# Patient Record
Sex: FEMALE | Race: WHITE
[De-identification: names, ages, dates, MRNs, and addresses within clinical notes are randomized per-mention and may not be internally consistent; named-entity substitution may affect disease eponyms.]

---

## 2018-02-21 ENCOUNTER — HOSPITAL ENCOUNTER (INPATIENT)
Dept: HOSPITAL 77 - KA.ED | Age: 68
LOS: 5 days | Discharge: HOME | DRG: 139 | End: 2018-02-26
Attending: FAMILY MEDICINE | Admitting: PHYSICIAN ASSISTANT
Payer: COMMERCIAL

## 2018-02-21 DIAGNOSIS — F41.9: ICD-10-CM

## 2018-02-21 DIAGNOSIS — R21: ICD-10-CM

## 2018-02-21 DIAGNOSIS — J18.9: Primary | ICD-10-CM

## 2018-02-21 DIAGNOSIS — E53.8: ICD-10-CM

## 2018-02-21 DIAGNOSIS — D64.9: ICD-10-CM

## 2018-02-21 DIAGNOSIS — Z88.5: ICD-10-CM

## 2018-02-21 DIAGNOSIS — D47.3: ICD-10-CM

## 2018-02-21 DIAGNOSIS — C92.90: ICD-10-CM

## 2018-02-21 DIAGNOSIS — R78.81: ICD-10-CM

## 2018-02-21 DIAGNOSIS — N39.0: ICD-10-CM

## 2018-02-21 DIAGNOSIS — D72.0: ICD-10-CM

## 2018-02-21 DIAGNOSIS — B95.4: ICD-10-CM

## 2018-02-21 DIAGNOSIS — Z79.899: ICD-10-CM

## 2018-02-21 DIAGNOSIS — K21.9: ICD-10-CM

## 2018-02-21 DIAGNOSIS — E87.1: ICD-10-CM

## 2018-02-21 DIAGNOSIS — R53.81: ICD-10-CM

## 2018-02-21 DIAGNOSIS — Z98.84: ICD-10-CM

## 2018-02-21 LAB
CHLORIDE SERPL-SCNC: 94 MMOL/L (ref 98–115)
SODIUM SERPL-SCNC: 130 MMOL/L (ref 136–145)

## 2018-02-21 RX ADMIN — OXYCODONE HYDROCHLORIDE SCH MG: 10 TABLET, FILM COATED, EXTENDED RELEASE ORAL at 20:08

## 2018-02-22 LAB
CHLORIDE SERPL-SCNC: 97 MMOL/L (ref 98–115)
SODIUM SERPL-SCNC: 132 MMOL/L (ref 136–145)

## 2018-02-22 RX ADMIN — VITAMIN D, TAB 1000IU (100/BT) SCH UNITS: 25 TAB at 09:59

## 2018-02-22 RX ADMIN — CYANOCOBALAMIN TAB 500 MCG SCH MCG: 500 TAB at 09:58

## 2018-02-22 RX ADMIN — OXYCODONE HYDROCHLORIDE SCH MG: 10 TABLET, FILM COATED, EXTENDED RELEASE ORAL at 21:26

## 2018-02-22 NOTE — PCM.HP
H&P History of Present Illness





- General


Date of Service: 02/22/18


Admit Problem/Dx: 


 Admission Diagnosis/Problem





Admission Diagnosis/Problem      Pneumonia








Source of Information: Patient, Old Records, RN


History Limitations: Reports: No Limitations


  ** Generalized


Pain Score (Numeric/FACES): 3





- Related Data


Allergies/Adverse Reactions: 


 Allergies











Allergy/AdvReac Type Severity Reaction Status Date / Time


 


morphine Allergy  Rash Verified 02/21/18 16:08











Home Medications: 


 Home Meds





Dasatinib [Sprycel] 100 mg PO BEDTIME 01/17/17 [History]


Etodolac [Etodolac] 500 mg PO DAILY 01/17/17 [History]


LORazepam [Ativan] 0.5 mg PO BEDTIME 01/17/17 [History]


oxyCODONE ER [OxyCONTIN] 20 mg PO BEDTIME 01/17/17 [History]


Cholecalciferol (Vitamin D3) [Vitamin D3] 2,000 units PO DAILY 02/21/18 [History

]


Cyanocobalamin (Vitamin B12) [Vitamin B12] 500 mcg PO DAILY 02/21/18 [History]


Ferrous Sulfate 325 mg PO DAILY 02/21/18 [History]


Multivitamin [Multivitamins] 1 cap PO DAILY 02/21/18 [History]


Ranitidine HCl [Ranitidine] 150 mg PO BIDAC 02/21/18 [History]


Triamcinolone Acetonide [Triamcinolone Acetonide 0.025% Crm] 1 applic TOP DAILY 

PRN 02/22/18 [History]











Past Medical History


HEENT History: Reports: Impaired Vision


Gastrointestinal History: Reports: GERD


Musculoskeletal History: Reports: Back Pain, Chronic


Neurological History: Reports: Headaches, Chronic, Migraines


Psychiatric History: Reports: Anxiety


Endocrine/Metabolic History: Reports: Obesity/BMI 30+


Oncologic (Cancer) History: Reports: Leukemia, Other (See Below)


Other Oncologic History: Leukemia diagnosed 2007; takes spiracel po daily.





- Infectious Disease History


Infectious Disease History: Reports: Chicken Pox, Measles, Mumps





- Past Surgical History


Head Surgeries/Procedures: Reports: None


HEENT Surgical History: Reports: Tonsillectomy


GI Surgical History: Reports: Appendectomy, Bariatric Procedure, Cholecystectomy


Endocrine Surgical History: Reports: None


Neurological Surgical History: Reports: Spinal Fusion


Musculoskeletal Surgical History: Reports: Arthroscopic Knee, Other (See Below)


Other Musculoskeletal Surgeries/Procedures:: bone taken from hip and placed in 

lower back


Oncologic Surgical History: Reports: Bone Marrow Aspiration


Dermatological Surgical History: Reports: None





Social & Family History





- Family History


Family Medical History: Noncontributory





- Tobacco Use


Smoking Status *Q: Never Smoker


Second Hand Smoke Exposure: No





- Caffeine Use


Caffeine Use: Reports: Soda





- Alcohol Use


Days Per Week of Alcohol Use: 0





- Recreational Drug Use


Recreational Drug Use: No





H&P Review of Systems





- Review of Systems:


Review Of Systems: See Below


General: Reports: Fever, Chills (yesterday severe chills, none now), Decreased 

Appetite (poor appetite past 2 weeks however improved today. ).  Denies: Night 

Sweats, Weight Loss, Weight Gain


HEENT: Reports: Other (on/off dizziness past 2 weeks tej since fever. ).  Denies

: Hearing Changes, Sore Throat, Vertigo


Pulmonary: Reports: Shortness of Breath.  Denies: Wheezing, Cough, Sputum


Cardiovascular: Reports: Lightheadedness.  Denies: Chest Pain, Palpitations, 

Orthopnea, PND, Syncope, Blood Pressure Problem


Gastrointestinal: Reports: Black Stool (black stools for past 3 weeks. ), 

Diarrhea (loose stools past 3 weeks. ), Decreased Appetite.  Denies: Abdominal 

Pain, Anorexia, Bloody Stool, Constipation


Genitourinary: Denies: Dysuria, Frequency, Burning, Pain, Urgency, Incontinence

, Hematuria


Musculoskeletal: Reports: Back Pain (hx of surgery on spine, bone cadaver. )


Skin: Reports: Pallor, Rash


Psychiatric: Reports: No Symptoms


Neurological: Reports: No Symptoms


Hematologic/Lymphatic: Reports: Other (on Iron. ).  Denies: Anemia


Immunologic: Reports: No Symptoms, Other (allergy to morphine )





Exam





- Exam


Exam: See Below





- Vital Signs


Vital Signs: 


 Last Vital Signs











Temp  97.2 F   02/22/18 06:19


 


Pulse  98   02/22/18 06:19


 


Resp  22 H  02/22/18 06:19


 


BP  119/53 L  02/22/18 06:19


 


Pulse Ox  98   02/22/18 07:00











Weight: 265 lb 2 oz





- Exam


Quality Assessment: No: Supplemental Oxygen


General: Alert, Oriented, Cooperative.  No: Mild Distress


HEENT: No: Mucosa Moist & Pink


Neck: Supple


Lungs: Rhonchi


Cardiovascular: Regular Rate, Regular Rhythm, Systolic Murmur (Grade II)


GI/Abdominal Exam: Normal Bowel Sounds


 (Female) Exam: Deferred


Rectal (Female) Exam: Deferred, Black Stool


Back Exam: No: CVA Tenderness (L), CVA Tenderness (R)


Skin: Other (diffuse erythematous rash right thigh, left side of left breast 

and back, non-coalescent, nonblanching,)


Neurological: Cranial Nerves Intact


Neuro Extensive - Mental Status: Alert, Oriented x3, Normal Mood/Affect, Normal 

Cognition


Psychiatric: Alert, Normal Affect, Normal Mood





- Patient Data


Lab Results Last 24 hrs: 


 Laboratory Results - last 24 hr











  02/21/18 02/22/18 02/22/18 Range/Units





  18:45 07:29 07:29 


 


WBC   17.3 H   (5.0-10.0)  10^3/uL


 


RBC   3.61 L   (3.80-5.50)  10^6/uL


 


Hgb   10.9 L D   (12.0-16.0)  g/dL


 


Hct   32.0 L   (37.0-47.0)  %


 


MCV   88.5   (82.0-92.0)  fL


 


MCH   30.2   (27.0-31.0)  pg


 


MCHC   34.1   (32.0-36.0)  g/dL


 


RDW   13.8   (11.5-14.5)  %


 


Plt Count   316 H D   (150-300)  10^3/uL


 


MPV   7.5   (7.4-10.4)  fL


 


Neut % (Auto)   89.8 H   (50.0-70.0)  %


 


Lymph % (Auto)   6.1 L   (20.0-40.0)  %


 


Mono % (Auto)   3.2   (2.0-8.0)  %


 


Eos % (Auto)   0.2 L   (1.0-3.0)  %


 


Baso % (Auto)   0.7   (0.0-1.0)  %


 


Neut # (Auto)   15.5 H   (2.5-7.0)  10^3/uL


 


Lymph # (Auto)   1.1   (1.0-4.0)  10^3/uL


 


Mono # (Auto)   0.6   (0.1-0.8)  10^3/uL


 


Eos # (Auto)   0.0 L   (0.1-0.3)  10^3/uL


 


Baso # (Auto)   0.1   (0.0-0.1)  10^3/uL


 


Sodium    132 L  (136-145)  mmol/L


 


Potassium    3.4  (3.3-5.3)  mmol/L


 


Chloride    97 L  ()  mmol/L


 


Carbon Dioxide    24.4  (21.0-32.0)  mmol/L


 


BUN    16  (6-25)  mg/dL


 


Creatinine    0.71  (0.51-1.17)  mg/dL


 


Est Cr Clr Drug Dosing    70.01  mL/min


 


Estimated GFR (MDRD)    > 60  mL/min


 


Glucose    129 H  ()  mg/dL


 


Calcium    8.1 L  (8.7-10.3)  mg/dL


 


Total Bilirubin    0.6  (0.2-1.0)  mg/dL


 


AST    25  (15-37)  U/L


 


ALT    31  (12-78)  U/L


 


Alkaline Phosphatase    98  ()  IU/L


 


Total Protein    6.3 L  (6.4-8.2)  g/dL


 


Albumin    2.51 L  (3.00-4.80)  g/dL


 


Specimen Type  Urinvoid    


 


Urine Color  Yellow    (YELLOW)  


 


Urine Appearance  Slightly cloudy H    (CLEAR)  


 


Urine pH  5.0    (5.0-9.0)  


 


Ur Specific Gravity  1.015    (1.005-1.030)  


 


Urine Protein  100 H    (NEGATIVE)  mg/dL


 


Urine Glucose (UA)  Negative    (NEGATIVE)  mg/dL


 


Urine Ketones  15 H    (NEGATIVE)  mg/dL


 


Urine Occult Blood  Trace-intact H    (NEGATIVE)  


 


Urine Nitrite  Negative    (NEGATIVE)  


 


Urine Bilirubin  Large H    (NEGATIVE)  


 


Urine Urobilinogen  0.2    (0.2-1.0)  E.U./dL


 


Ur Leukocyte Esterase  Small H    (NEGATIVE)  


 


Urine RBC  0-5    /HPF


 


Urine WBC  >100 H    /HPF


 


Ur Epithelial Cells  Few    /LPF


 


Urine Bacteria  Moderate H    (NONE TO FEW)  /HPF











Result Diagrams: 


 02/23/18 07:33





 02/23/18 07:33





*Q Meaningful Use (ADM)





- VTE *Q


VTE Criteria *Q: 








- Stroke *Q


Stroke Criteria *Q: 








- AMI *Q


AMI Criteria *Q: 





Problem List Initiated/Reviewed/Updated: Yes


Orders Last 24hrs: 


 Active Orders 24 hr











 Category Date Time Status


 


 Activity as Tolerated [RC] DAILY Care  02/21/18 18:15 Active


 


 Regular Diet [DIET] Diet  02/22/18 Dinner Active


 


 CULTURE URINE [RM] Routine Lab  02/21/18 18:45 Received


 


 Acetaminophen [Tylenol Extra Strength] Med  02/21/18 18:41 Active





 1,000 mg PO Q4H PRN   


 


 Cholecalciferol (Vitamin D3) [Vitamin D3] Med  02/22/18 09:00 Active





 2,000 units PO DAILY   


 


 Cyanocobalamin (Vitamin B12) [Vitamin B12] Med  02/22/18 09:00 Active





 500 mcg PO DAILY   


 


 Dasatinib [Sprycel] Med  02/21/18 21:00 Active





 100 mg PO BEDTIME   


 


 Dextrose 5%-0.45% NaCl [Dextrose 5%-1/2 NS] 1,000 ml Med  02/21/18 18:45 Active





 IV ASDIRECTED   


 


 Etodolac [Etodolac] Med  02/22/18 09:00 Active





 500 mg PO DAILY   


 


 Ferrous Sulfate Med  02/22/18 09:00 Active





 325 mg PO DAILY   


 


 LORazepam [Ativan] Med  02/21/18 21:00 Active





 0.5 mg PO BEDTIME   


 


 Non-Formulary Medication [NF Drug] Med  02/21/18 19:33 Pending





 0 each TOP DAILY PRN   


 


 Ranitidine [Zantac] Med  02/21/18 21:00 Active





 150 mg PO BID   


 


 Triamcinolone Acetonide [Triamcinolone Acetonide 0.1% Med  02/22/18 05:45 

Active





 Oint]   





 0.5 gm TOP BID PRN   


 


 oxyCODONE ER [OxyCONTIN] Med  02/21/18 21:00 Active





 20 mg PO BEDTIME   








 Medication Orders





Acetaminophen (Tylenol Extra Strength)  1,000 mg PO Q4H PRN


   PRN Reason: Fever


Cholecalciferol (Vitamin D3)  2,000 units PO DAILY LOAN


Cyanocobalamin (Vitamin B12)  500 mcg PO DAILY LOAN


Ferrous Sulfate (Ferrous Sulfate)  325 mg PO DAILY LOAN


Sodium Chloride (Normal Saline)  500 mls @ 500 mls/hr IV ASDIRECTED LOAN


   Last Admin: 02/21/18 16:53  Dose: 500 mls/hr


Dextrose/Sodium Chloride (Dextrose 5%-1/2 Ns)  1,000 mls @ 50 mls/hr IV 

ASDIRECTED LOAN


   Last Admin: 02/21/18 18:45  Dose: 50 mls/hr


Lorazepam (Ativan)  0.5 mg PO BEDTIME LOAN


   Last Admin: 02/21/18 20:07  Dose: 0.5 mg


(Dasatinib [Sprycel] (100 Mg)*Pt Own Med*)  100 mg PO BEDTIME [facility]


   Last Admin: 02/21/18 20:08  Dose: 100 mg


(Etodolac [Etodolac] (500 Mg)*Pt Own Med*)  500 mg PO DAILY [facility]


Triamcinolone Acetonide 0.025% Cream*Pt Own Med*  0 each TOP DAILY PRN


   PRN Reason: Rash


Oxycodone HCl (Oxycontin)  20 mg PO BEDTIME [facility]


   Last Admin: 02/21/18 20:08  Dose: 20 mg


Ranitidine HCl (Zantac)  150 mg PO BID [facility]


   Last Admin: 02/21/18 20:08  Dose: 150 mg


Sodium Chloride (Syrex Flush)  5 ml FLUSH Q8HR PRN


   PRN Reason: Keep Vein Open


Triamcinolone Acetonide (Triamcinolone Acetonide 0.1% Oint)  0.5 gm TOP BID PRN


   PRN Reason: Itching


   Last Admin: 02/22/18 06:07  Dose: 1 applic








Assessment/Plan Comment:: 


HISTORY OF PRESENT ILLNESS


67-year-old female was admitted through the ED department due to fever 

shortness of breath.  She states she has been having some upper respiratory 

bronchitis like symptoms for about 2 weeks, fever chills yesterday feels better 

today. Yesterday on admission she noticed a significant puritic rash on her 

chest abdomen.  She stated this rash was quite significant on her left thigh 

about one month ago--spontaneously resolved however now with on her left leg 

back and left breast.








significant ED findings


white count 20,000


Lactic acid 2.2 (likely before hydration)


UTI, however asymptomatic


Chest x-ray,mild interstitial bilateral infiltrates, possible PA and lateral, 


negative influenza


Rocephin and Zithromax





________________________________________________________________________________





Primary impression





Pneumonia, CAP, POA, likely strep, although positive blood cultures gram-

positive cocci both anaerobic and aerobic will continue with course of 

treatment for now since she is improving clinically, definitive ABX changed 

will be determined on BC surveillance. If Strep will remove azithromycin, Hold 

TNF for her CML. 





Bacteremia, lactic acid slightly high before hydration. MAP adequate, in the 

setting of infection qSOFA, 1/3.  blood culture surveillance, afebrile,





Anemia, not present on admission, normocytic normochromic, likely WBC > RBC stem

-cell production, infection related.  HX CML





Rash, right anterior medial thigh, back, left breast, confluent, warm to touch, 

smooth, non-petechial, received steroids. does not appear to be cellulitis, 

likely inflammatory reaction to infection, Hold TNF but doubtful contributory. 

conservative tx for now. Outline monitor progress. 





Thrombocytosis, reactive, trending positively





Hyponatremia, hypotonic, improved. 





Urinary tract infection, uncomplicated, asymptomatic. Likely cross covered





Chronic myelogenous leukemia, (CML) dx 2007, followed by oncologist, will hold 

TNF. 





Consultation, reviewed case with oncologist Dr. Chua, he agrees with plan. 

Continue to monitor








other chronic conditions


hiatal hernia


Obesity


Osteopenia


Gastric bypass, on vitamin B12 supplementation


history of laminectomy, chronic back pain, spondylolisthesis, On COT, MEDD 

60mg. 











Hold TNF, continue with course of treatment for now since she is improving 

clinically, BC Surveillance

## 2018-02-23 LAB
CHLORIDE SERPL-SCNC: 101 MMOL/L (ref 98–115)
SODIUM SERPL-SCNC: 136 MMOL/L (ref 136–145)

## 2018-02-23 RX ADMIN — OXYCODONE HYDROCHLORIDE SCH MG: 10 TABLET, FILM COATED, EXTENDED RELEASE ORAL at 21:07

## 2018-02-23 RX ADMIN — CYANOCOBALAMIN TAB 500 MCG SCH MCG: 500 TAB at 08:42

## 2018-02-23 RX ADMIN — VITAMIN D, TAB 1000IU (100/BT) SCH UNITS: 25 TAB at 08:42

## 2018-02-23 NOTE — PCM.PN
- General Info


Date of Service: 02/23/18


Functional Status: Reports: Pain Controlled, Tolerating Diet, Urinating, New 

Symptoms (Severe left-sided temporal headache, she states it started after 

azithromycin administration IV last night).  Denies: Ambulating





- Review of Systems


General: Denies: Fever, Weakness


HEENT: Reports: Headaches (Left-sided temporal headache, sharp, pounding,)


Pulmonary: Reports: No Symptoms


Cardiovascular: Reports: No Symptoms


Gastrointestinal: Reports: No Symptoms


Genitourinary: Denies: Dysuria, Frequency, Flank Pain


Skin: Reports: Rash (Back, abdomen, left breast and right thigh--improvement)


Neurological: Denies: Confusion, Dizziness, Numbness, Paresthesia, Change in 

Speech


Psychiatric: Reports: No Symptoms





- Patient Data


Vitals - Most Recent: 


 Last Vital Signs











Temp  98.6 F   02/23/18 06:56


 


Pulse  76   02/23/18 06:56


 


Resp  16   02/23/18 06:56


 


BP  120/55 L  02/23/18 06:56


 


Pulse Ox  96   02/23/18 08:10











Weight - Most Recent: 265 lb 2 oz


I&O - Last 24 Hours: 


 Intake & Output











 02/22/18 02/23/18 02/23/18





 22:59 06:59 14:59


 


Intake Total 910 1125 


 


Output Total 1600 300 


 


Balance -690 825 











Lab Results Last 24 Hours: 


 Laboratory Results - last 24 hr











  02/23/18 02/23/18 Range/Units





  07:33 07:33 


 


WBC   10.6 H  (5.0-10.0)  10^3/uL


 


RBC   3.34 L  (3.80-5.50)  10^6/uL


 


Hgb   9.9 L  (12.0-16.0)  g/dL


 


Hct   29.6 L  (37.0-47.0)  %


 


MCV   88.7  (82.0-92.0)  fL


 


MCH   29.6  (27.0-31.0)  pg


 


MCHC   33.4  (32.0-36.0)  g/dL


 


RDW   14.0  (11.5-14.5)  %


 


Plt Count   331 H  (150-300)  10^3/uL


 


MPV   7.4  (7.4-10.4)  fL


 


Neut % (Auto)   76.7 H  (50.0-70.0)  %


 


Lymph % (Auto)   13.4 L  (20.0-40.0)  %


 


Mono % (Auto)   8.5 H  (2.0-8.0)  %


 


Eos % (Auto)   0.5 L  (1.0-3.0)  %


 


Baso % (Auto)   0.9  (0.0-1.0)  %


 


Neut # (Auto)   8.1 H  (2.5-7.0)  10^3/uL


 


Lymph # (Auto)   1.4  (1.0-4.0)  10^3/uL


 


Mono # (Auto)   0.9 H  (0.1-0.8)  10^3/uL


 


Eos # (Auto)   0.1  (0.1-0.3)  10^3/uL


 


Baso # (Auto)   0.1  (0.0-0.1)  10^3/uL


 


Sodium  136   (136-145)  mmol/L


 


Potassium  3.3   (3.3-5.3)  mmol/L


 


Chloride  101   ()  mmol/L


 


Carbon Dioxide  24.1   (21.0-32.0)  mmol/L


 


BUN  17   (6-25)  mg/dL


 


Creatinine  0.66   (0.51-1.17)  mg/dL


 


Est Cr Clr Drug Dosing  75.31   mL/min


 


Estimated GFR (MDRD)  > 60   mL/min


 


Glucose  123 H   ()  mg/dL


 


Calcium  8.4 L   (8.7-10.3)  mg/dL











Daniel Results Last 24 Hours: 


 Microbiology











 02/21/18 18:45 Urine Culture - Preliminary





 Urine, Voided    NO GROWTH AFTER 1 DAY











Med Orders - Current: 


 Current Medications





Acetaminophen (Tylenol Extra Strength)  1,000 mg PO Q4H PRN


   PRN Reason: Fever


   Last Admin: 02/23/18 08:39 Dose:  1,000 mg


Ceftriaxone Sodium (Rocephin)  1 gm IVPUSH Q24H [facility]


   Last Admin: 02/22/18 16:15 Dose:  1 gm


Cholecalciferol (Vitamin D3)  2,000 units PO DAILY [facility]


   Last Admin: 02/23/18 08:42 Dose:  2,000 units


Cyanocobalamin (Vitamin B12)  500 mcg PO DAILY [facility]


   Last Admin: 02/23/18 08:42 Dose:  500 mcg


Ferrous Sulfate (Ferrous Sulfate)  325 mg PO DAILY [facility]


   Last Admin: 02/23/18 08:39 Dose:  325 mg


Dextrose/Sodium Chloride (Dextrose 5%-1/2 Ns)  1,000 mls @ 50 mls/hr IV 

ASDIRECTED [facility]


   Last Admin: 02/22/18 14:59 Dose:  50 mls/hr


Azithromycin 500 mg/ Sodium (Chloride)  250 mls @ 250 mls/hr IV Q24H [facility]


   Last Admin: 02/22/18 17:38 Dose:  250 mls/hr


Lorazepam (Ativan)  0.5 mg PO BEDTIME LOAN


   Last Admin: 02/22/18 21:26 Dose:  0.5 mg


(Dasatinib [Sprycel] (100 Mg)*Pt Own Med*)  100 mg PO BEDTIME [facility]


   Last Admin: 02/21/18 20:08 Dose:  100 mg


(Etodolac [Etodolac] (500 Mg)*Pt Own Med*)  500 mg PO DAILY [facility]


   Last Admin: 02/23/18 08:43 Dose:  500 mg


Oxycodone HCl (Oxycontin)  20 mg PO BEDTIME [facility]


   Last Admin: 02/22/18 21:26 Dose:  20 mg


Ranitidine HCl (Zantac)  150 mg PO BIDAC [facility]


   Last Admin: 02/23/18 07:51 Dose:  150 mg


Sodium Chloride (Syrex Flush)  5 ml FLUSH Q8HR PRN


   PRN Reason: Keep Vein Open


Triamcinolone Acetonide (Triamcinolone Acetonide 0.1% Oint)  0.5 gm TOP BID PRN


   PRN Reason: Itching


   Last Admin: 02/23/18 09:27 Dose:  1 applic





Discontinued Medications





Acetaminophen (Tylenol)  650 mg PO NOW ONE


   Stop: 02/21/18 15:45


   Last Admin: 02/21/18 16:00 Dose:  650 mg


Ceftriaxone Sodium (Rocephin)  1 gm IVPUSH ONETIME ONE


   Stop: 02/21/18 17:21


   Last Admin: 02/21/18 17:27 Dose:  1 gm


Diphenhydramine HCl (Benadryl)  25 mg PO ONETIME ONE


   Stop: 02/22/18 05:45


   Last Admin: 02/22/18 06:05 Dose:  25 mg


Sodium Chloride (Normal Saline)  500 mls @ 999 mls/hr IV .BOLUS ONE


   Stop: 02/21/18 16:09


   Last Infusion: 02/21/18 16:29 Dose:  Infused


Sodium Chloride (Normal Saline)  500 mls @ 500 mls/hr IV ASDIRECTED [facility]


   Last Admin: 02/21/18 16:53 Dose:  500 mls/hr


Sodium Chloride (Normal Saline) Confirm Administered Dose 500 mls @ as directed 

.ROUTE .STK-MED ONE


   Stop: 02/21/18 16:46


   Last Admin: 02/21/18 16:50 Dose:  Not Given


Azithromycin 500 mg/ Sodium (Chloride)  250 mls @ 250 mls/hr IV ONETIME ONE


   Stop: 02/21/18 18:19


   Last Admin: 02/21/18 18:05 Dose:  250 mls/hr


Dextrose/Sodium Chloride (Dextrose 5%-1/2 Ns)  1,000 mls @ 75 mls/hr IV 

ASDIRECTED [facility]


Ibuprofen (Motrin)  600 mg PO ONETIME ONE


   Stop: 02/21/18 19:03


   Last Admin: 02/21/18 19:08 Dose:  600 mg


Methylprednisolone Sodium Succinate (Solu-Medrol)  40 mg IVPUSH ONETIME ONE


   Stop: 02/22/18 05:50


   Last Admin: 02/22/18 06:05 Dose:  40 mg


Ondansetron HCl (Zofran)  4 mg IVPUSH ONETIME ONE


   Stop: 02/21/18 15:43


   Last Admin: 02/21/18 15:58 Dose:  4 mg


Ranitidine HCl (Zantac)  150 mg PO BID [facility]


   Last Admin: 02/22/18 09:59 Dose:  150 mg











- Exam


Quality Assessment: No: Supplemental Oxygen


General: Alert, Oriented


HEENT: Pupils Equal, Pupils Reactive, Mucous Membr. Moist/Pink, Other (No 

tenderness palpable temporal artery)


Neck: Supple, No JVD.  No: Lymphadenopathy


Lungs: Clear to Auscultation, Normal Respiratory Effort


Cardiovascular: Regular Rate, Regular Rhythm, Murmurs


GI/Abdominal Exam: Soft, No Distention


 (Female) Exam: Deferred


Back Exam: No: CVA Tenderness (L), CVA Tenderness (R)


Extremities: Normal Capillary Refill, Increased Warmth (Increased warmth to 

area on right leg)


Skin: Rash (Improving rash, receding from margins posterior thorax, right thigh

, left side of breast)


Neurological: Normal Speech (no dysarthria), Cranial Nerves Intact





- Problem List Review


Problem List Initiated/Reviewed/Updated: Yes





- My Orders


Last 24 Hours: 


My Active Orders





02/22/18 11:39


Code Status [Resuscitation Status] Routine 





02/22/18 16:30


cefTRIAXone [Rocephin]   1 gm IVPUSH Q24H 





02/22/18 17:00


Azithromycin [Zithromax] 500 mg   Sodium Chloride 0.9% [Normal Saline] 250 ml 

IV Q24H 














- Plan


Plan:: 


HISTORY OF PRESENT ILLNESS


67-year-old female was admitted through the ED department due to fever 

shortness of breath.  She states she has been having some upper respiratory 

bronchitis like symptoms for about 2 weeks, fever chills yesterday feels better 

today. Yesterday on admission she noticed a significant puritic rash on her 

chest abdomen.  She stated this rash was quite significant on her left thigh 

about one month ago--spontaneously resolved however now with on her left leg 

back and left breast.








significant ED findings


white count 20,000


Lactic acid 2.2 (likely before hydration)


UTI, however asymptomatic


Chest x-ray,mild interstitial bilateral infiltrates, possible PA and lateral, 


negative influenza


Rocephin and Zithromax





________________________________________________________________________________


Patient progress today,  clinically responding to treatment, rash slightly 

receding, Tmax 99.1, MAP good, RR normal, off O2 now, white count trending 

positively, decreasing neutrophilia, favorable monocytosis, feeling a lot 

better other than left-sided temporal headache.  











Primary Impression:





Pneumonia, CAP, POA, likely strep, although positive blood cultures gram-

positive cocci both anaerobic and aerobic will continue with course of 

treatment for now since she is improving clinically, definitive ABX changed 

will be determined on BC surveillance. If Strep will remove azithromycin, Hold 

TNF for her CML, although unlikely contributory.  





Bacteremia, lactic acid slightly high before hydration on admission.  MAP 

adequate, in the setting of infection qSOFA now 0/3.  BC surveillance, afebrile,





Anemia, not present on admission, normocytic normochromic, likely WBC > RBC stem

-cell production, infection related.  HX CML





Rash, RLL, back, left breast, confluent, does not appear to be cellulitis, 

improved approximately 25%, Hold TNF but doubtful contributory. conservative tx 

for now with monitoring. Likely hyper-inflammatory response d/t infection





Thrombocytosis, reactive, likely





Neutrophilia, decreasing





Monocytosis, favorable trend in light of infection





Headache, left temporal, ESR today, due to infection likely elevated--notify if 

above 70, Tylenol for now. We'll monitor. Doubtful if GCA





Hyponatremia, hypotonic, improved. 





Urinary tract infection, uncomplicated, asymptomatic. Likely cross covered





Chronic myelogenous leukemia, ([facility]) dx 2007, followed by oncologist, will hold 

TNF, restart in a few days 





DVT prophylaxis, LMWH added. 











other chronic conditions


hiatal hernia


Obesity


Osteopenia


Gastric bypass, on vitamin B12 supplementation


history of laminectomy, chronic back pain, spondylolisthesis, On COT, MEDD 

60mg. 











Disposition/overall plan/discharge planning, continue inpatient status, 

continue current IV ABX, saline lock IV, monitor rash, BC surveillance, 

discussed with patient regarding the need for approximately 5 total days on 

blood culture surveillance--awaiting ID sensitivity and organism from [facility] 

however clinically responding.  Will consider ECHO o/p due to histories of 

flash fevers at home with unknown etiology

## 2018-02-24 LAB
CHLORIDE SERPL-SCNC: 103 MMOL/L (ref 98–115)
SODIUM SERPL-SCNC: 139 MMOL/L (ref 136–145)

## 2018-02-24 RX ADMIN — OXYCODONE HYDROCHLORIDE SCH MG: 10 TABLET, FILM COATED, EXTENDED RELEASE ORAL at 21:02

## 2018-02-24 RX ADMIN — VITAMIN D, TAB 1000IU (100/BT) SCH UNITS: 25 TAB at 08:44

## 2018-02-24 RX ADMIN — Medication PRN ML: at 02:30

## 2018-02-24 RX ADMIN — Medication PRN ML: at 21:03

## 2018-02-24 RX ADMIN — Medication PRN ML: at 16:13

## 2018-02-24 RX ADMIN — CYANOCOBALAMIN TAB 500 MCG SCH MCG: 500 TAB at 08:44

## 2018-02-24 NOTE — PCM.PN
- General Info


Date of Service: 02/24/18


Subjective Update: 


Ms. Baker reports ongoing improvement today. Breathing, in particular, 

continues to improve. Rash is less red, has receded, and is not painful or 

itchy.  Reports overall poor appetite and constipation, but no abdominal pain 

or nausea. No other new concerns, and in particular denies fever, chills, chest 

pain, shortness of breath, headache, or other skin changes.





- Patient Data


Vitals - Most Recent: 


 Last Vital Signs











Temp  37.1 C   02/24/18 06:18


 


Pulse  89   02/24/18 06:18


 


Resp  20   02/24/18 06:18


 


BP  114/59 L  02/24/18 06:18


 


Pulse Ox  96   02/24/18 07:47











Weight - Most Recent: 120.259 kg


I&O - Last 24 Hours: 


 Intake & Output











 02/23/18 02/24/18 02/24/18





 22:59 06:59 14:59


 


Intake Total 1000 550 


 


Output Total 500 300 


 


Balance 500 250 











Lab Results Last 24 Hours: 


 Laboratory Results - last 24 hr











  02/23/18 02/24/18 02/24/18 Range/Units





  07:10 08:25 08:25 


 


WBC   9.8   (5.0-10.0)  10^3/uL


 


RBC   3.67 L   (3.80-5.50)  10^6/uL


 


Hgb   11.0 L   (12.0-16.0)  g/dL


 


Hct   32.4 L   (37.0-47.0)  %


 


MCV   88.2   (82.0-92.0)  fL


 


MCH   30.0   (27.0-31.0)  pg


 


MCHC   34.0   (32.0-36.0)  g/dL


 


RDW   13.6   (11.5-14.5)  %


 


Plt Count   378 H   (150-300)  10^3/uL


 


MPV   7.9   (7.4-10.4)  fL


 


Neut % (Auto)   80.6 H   (50.0-70.0)  %


 


Lymph % (Auto)   12.5 L   (20.0-40.0)  %


 


Mono % (Auto)   5.2   (2.0-8.0)  %


 


Eos % (Auto)   1.3   (1.0-3.0)  %


 


Baso % (Auto)   0.4   (0.0-1.0)  %


 


Neut # (Auto)   8.0 H   (2.5-7.0)  10^3/uL


 


Lymph # (Auto)   1.2   (1.0-4.0)  10^3/uL


 


Mono # (Auto)   0.5   (0.1-0.8)  10^3/uL


 


Eos # (Auto)   0.1   (0.1-0.3)  10^3/uL


 


Baso # (Auto)   0.0   (0.0-0.1)  10^3/uL


 


ESR  100 H    (0-20)  mm/hr


 


Sodium    139  (136-145)  mmol/L


 


Potassium    3.2 L  (3.3-5.3)  mmol/L


 


Chloride    103  ()  mmol/L


 


Carbon Dioxide    25.4  (21.0-32.0)  mmol/L


 


BUN    12  (6-25)  mg/dL


 


Creatinine    0.64  (0.51-1.17)  mg/dL


 


Est Cr Clr Drug Dosing    77.66  mL/min


 


Estimated GFR (MDRD)    > 60  mL/min


 


Glucose    145 H  ()  mg/dL


 


Calcium    8.6 L  (8.7-10.3)  mg/dL











Daniel Results Last 24 Hours: 


 Microbiology











 02/21/18 18:45 Urine Culture - Final





 Urine, Voided    NO GROWTH AFTER 2 DAYS











Med Orders - Current: 


 Current Medications





Acetaminophen (Tylenol Extra Strength)  1,000 mg PO Q4H PRN


   PRN Reason: Fever


   Last Admin: 02/24/18 04:15 Dose:  1,000 mg


Ceftriaxone Sodium (Rocephin)  1 gm IVPUSH Q24H [facility]


   Last Admin: 02/23/18 16:57 Dose:  1 gm


Cholecalciferol (Vitamin D3)  2,000 units PO DAILY [facility]


   Last Admin: 02/24/18 08:44 Dose:  2,000 units


Cyanocobalamin (Vitamin B12)  500 mcg PO DAILY [facility]


   Last Admin: 02/24/18 08:44 Dose:  500 mcg


Enoxaparin Sodium (Lovenox)  40 mg SUBCUT DAILY [facility]


   Last Admin: 02/24/18 08:44 Dose:  40 mg


Ferrous Sulfate (Ferrous Sulfate)  325 mg PO DAILY [facility]


   Last Admin: 02/24/18 08:43 Dose:  325 mg


Azithromycin 500 mg/ Sodium (Chloride)  250 mls @ 250 mls/hr IV Q24H [facility]


   Last Admin: 02/23/18 17:02 Dose:  250 mls/hr


Sodium Chloride (Normal Saline)  100 mls @ 100 mls/hr IV ASDIRECTED [facility]


Lorazepam (Ativan)  0.5 mg PO BEDTIME LOAN


   Last Admin: 02/23/18 21:07 Dose:  0.5 mg


(Dasatinib [Sprycel] (100 Mg)*Pt Own Med*)  100 mg PO BEDTIME LOAN


   Last Admin: 02/21/18 20:08 Dose:  100 mg


(Etodolac [Etodolac] (500 Mg)*Pt Own Med*)  500 mg PO DAILY LOAN


   Last Admin: 02/24/18 08:43 Dose:  500 mg


Oxycodone HCl (Oxycontin)  20 mg PO BEDTIME LOAN


   Last Admin: 02/23/18 21:07 Dose:  20 mg


Ranitidine HCl (Zantac)  150 mg PO BIDAC [facility]


   Last Admin: 02/24/18 07:37 Dose:  150 mg


Sodium Chloride (Syrex Flush)  5 ml FLUSH Q8HR PRN


   PRN Reason: Keep Vein Open


   Last Admin: 02/24/18 02:30 Dose:  5 ml


Triamcinolone Acetonide (Triamcinolone Acetonide 0.1% Oint)  0.5 gm TOP BID PRN


   PRN Reason: Itching


   Last Admin: 02/23/18 22:15 Dose:  1 applic





Discontinued Medications





Acetaminophen (Tylenol)  650 mg PO NOW ONE


   Stop: 02/21/18 15:45


   Last Admin: 02/21/18 16:00 Dose:  650 mg


Ceftriaxone Sodium (Rocephin)  1 gm IVPUSH ONETIME ONE


   Stop: 02/21/18 17:21


   Last Admin: 02/21/18 17:27 Dose:  1 gm


Diphenhydramine HCl (Benadryl)  25 mg PO ONETIME ONE


   Stop: 02/22/18 05:45


   Last Admin: 02/22/18 06:05 Dose:  25 mg


Sodium Chloride (Normal Saline)  500 mls @ 999 mls/hr IV .BOLUS ONE


   Stop: 02/21/18 16:09


   Last Infusion: 02/21/18 16:29 Dose:  Infused


Sodium Chloride (Normal Saline)  500 mls @ 500 mls/hr IV ASDIRECTED [facility]


   Last Admin: 02/21/18 16:53 Dose:  500 mls/hr


Sodium Chloride (Normal Saline) Confirm Administered Dose 500 mls @ as directed 

.ROUTE .STK-MED ONE


   Stop: 02/21/18 16:46


   Last Admin: 02/21/18 16:50 Dose:  Not Given


Azithromycin 500 mg/ Sodium (Chloride)  250 mls @ 250 mls/hr IV ONETIME ONE


   Stop: 02/21/18 18:19


   Last Admin: 02/21/18 18:05 Dose:  250 mls/hr


Dextrose/Sodium Chloride (Dextrose 5%-1/2 Ns)  1,000 mls @ 75 mls/hr IV 

ASDIRECTED LOAN


Dextrose/Sodium Chloride (Dextrose 5%-1/2 Ns)  1,000 mls @ 50 mls/hr IV 

ASDIRECTED [facility]


   Last Admin: 02/22/18 14:59 Dose:  50 mls/hr


Ibuprofen (Motrin)  600 mg PO ONETIME ONE


   Stop: 02/21/18 19:03


   Last Admin: 02/21/18 19:08 Dose:  600 mg


Methylprednisolone Sodium Succinate (Solu-Medrol)  40 mg IVPUSH ONETIME ONE


   Stop: 02/22/18 05:50


   Last Admin: 02/22/18 06:05 Dose:  40 mg


Ondansetron HCl (Zofran)  4 mg IVPUSH ONETIME ONE


   Stop: 02/21/18 15:43


   Last Admin: 02/21/18 15:58 Dose:  4 mg


Potassium Chloride (Klor-Con M20)  40 meq PO ONETIME ONE


   Stop: 02/24/18 11:25


Ranitidine HCl (Zantac)  150 mg PO BID [facility]


   Last Admin: 02/22/18 09:59 Dose:  150 mg


Sodium Chloride (Syrex Flush)  5 ml FLUSH Q8HR PRN


   PRN Reason: Keep Vein Open











- Exam


Physical Findings Comments:: 


GENERAL: Well-appearing adult female lying in hospital bed in no acute distress.


HEENT: Normocephalic, atraumatic.  Conjunctiva clear.  Nares patent without 

discharge.  Mucous membranes moist.


CV: Regular rate and rhythm, no murmurs, rubs, or gallops.  2+ radial pulses.


PULMONARY: Normal effort, clear to auscultation bilaterally, no wheezes, rales, 

or rhonchi.


ABDOMEN: Positive bowel sounds, soft, nontender, nondistended, no 

hepatosplenomegaly.


EXTREMITIES: No edema, cyanosis, or clubbing.


MUSCULOSKELETAL: Moves all extremities well.


NEUROLOGICAL: No obvious deficits.


DERMATOLOGIC: R thigh and L breast with diffuse erythema within markings. 

Remainder of extremities and torso without rash. No petechiae, splinter 

hemorrhages, or subcutaneous nodules.


PSYCHIATRIC: Alert, interactive, appropriate affect.





- Problem List Review


Problem List Initiated/Reviewed/Updated: Yes





- My Orders


Last 24 Hours: 


My Active Orders





02/24/18 11:29


Vital Signs [RC] Q8H 





02/24/18 17:30


Sodium Chloride 0.9% [Normal Saline] 100 ml IV ASDIRECTED 





02/25/18 05:11


BASIC METABOLIC PANEL,BMP [CHEM] AM 


CBC WITH AUTO DIFF [HEME] AM 


ESR [SEDIMENTATION RATE MANUAL] [HEME] AM 














- Plan


Plan:: 


67yoF with history notable for CML admitted for pneumonia following 

presentation with fever and worsening respiratory symptoms which started 2 

weeks prior, and worsening pruritic rash for which she had a month prior as 

well. Workup revealed WBC 20, lactate 2.2, CXR with mild bilateral interstitial 

infiltrates vs. edema, and negative influenza testing. She was started on 

ceftriaxone and azithromycin. 





# Pneumonia, community acquired: Presentation as above. Likely streptococcal. 

Ongoing clinical and laboratory improvement. Awaiting definitive cultures 

before de-escalating antibiotics.





# Bacteremia: Lactic acid 2.2 on admission before hydration. Afebrile since 2/ 21 at 1900. Awaiting definitive cultures before de-escalating antibitoics. Plan 

for echo next week given recurrent fevers; reassuring clinical status without 

murmur or other findings of infective endocarditis.  





# Rash: RLL, back, left breast. Thought to be hyperinflammatory response 

related to infection. CML with possible contribution and holding TNF. 

Improving. Continue monitoring.





# Neutrophilia: Improving.





# Anemia, normocytic normochromic: Not present on admission. Likely infection 

related with WBC > RBC stem-cell production. Improving.





# Thrombocytosis: Likely reactive. Improving.





# Asymptomatic bacteruria: No indication for treatment, though likely covered 

by abx for pneumonia.





# Chronic myelogenous leukemia: Dx 2007, followed by oncology. Holding TNF; 

plan to restart in the coming days if ongoing clinical improvement.





Other chronic conditions:


# Obesity


# Osteopenia: Ca/D.


# Hx gastric bypass: Vitamin B12 supplementation.


# Hx laminectomy, spondylolisthesis, and chronic back pain: Oxycodone (MEDD 60mg

). 





Hospitalization details:


# FEN: No IVF. Electrolytes normal; recheck tomorrow. Regular diet.


# PPX: Enoxaparin for DVT ppx.


# Code status: DNR/DNI.


# Emergency contact: , Nicholas. 


# Disposition: Continue on inpatient with plan to discharge to home in 1-2 days 

following ongoing clinical improvement and definitive blood culture results.

## 2018-02-25 LAB
CHLORIDE SERPL-SCNC: 104 MMOL/L (ref 98–115)
SODIUM SERPL-SCNC: 139 MMOL/L (ref 136–145)

## 2018-02-25 RX ADMIN — Medication PRN ML: at 16:19

## 2018-02-25 RX ADMIN — VITAMIN D, TAB 1000IU (100/BT) SCH UNITS: 25 TAB at 08:47

## 2018-02-25 RX ADMIN — OXYCODONE HYDROCHLORIDE SCH MG: 10 TABLET, FILM COATED, EXTENDED RELEASE ORAL at 21:49

## 2018-02-25 RX ADMIN — CYANOCOBALAMIN TAB 500 MCG SCH MCG: 500 TAB at 08:47

## 2018-02-25 RX ADMIN — Medication PRN ML: at 08:46

## 2018-02-25 NOTE — PCM.PN
- General Info


Date of Service: 02/25/18


Subjective Update: 


Ms. Baker reports ongoing improvement today. Minimal cough and no shortness 

of breath. Rash continues to be less red, has receded, and is not painful or 

itchy. Reports overall fatigue. Has not been ambulating more than going to the 

bathroom. Constipation resolved with Miralax yesterday. No other new concerns, 

and in particular denies fever, chills, headache, chest pain, abdominal pain, 

nausea, or new skin changes.





- Patient Data


Vitals - Most Recent: 


 Last Vital Signs











Temp  37.1 C   02/25/18 14:15


 


Pulse  78   02/25/18 14:15


 


Resp  14   02/25/18 14:15


 


BP  152/81 H  02/25/18 14:15


 


Pulse Ox  97   02/25/18 14:15











Weight - Most Recent: 120.259 kg


I&O - Last 24 Hours: 


 Intake & Output











 02/25/18 02/25/18 02/25/18





 06:59 14:59 22:59


 


Intake Total 400 665 


 


Output Total 300 850 


 


Balance 100 -185 











Lab Results Last 24 Hours: 


 Laboratory Results - last 24 hr











  02/25/18 02/25/18 Range/Units





  07:37 07:37 


 


WBC  8.3   (5.0-10.0)  10^3/uL


 


RBC  3.54 L   (3.80-5.50)  10^6/uL


 


Hgb  10.3 L   (12.0-16.0)  g/dL


 


Hct  31.7 L   (37.0-47.0)  %


 


MCV  89.6   (82.0-92.0)  fL


 


MCH  29.1   (27.0-31.0)  pg


 


MCHC  32.4   (32.0-36.0)  g/dL


 


RDW  13.3   (11.5-14.5)  %


 


Plt Count  404 H   (150-300)  10^3/uL


 


MPV  7.4   (7.4-10.4)  fL


 


Neut % (Auto)  73.2 H   (50.0-70.0)  %


 


Lymph % (Auto)  13.3 L   (20.0-40.0)  %


 


Mono % (Auto)  10.9 H   (2.0-8.0)  %


 


Eos % (Auto)  2.1   (1.0-3.0)  %


 


Baso % (Auto)  0.5   (0.0-1.0)  %


 


Neut # (Auto)  6.1   (2.5-7.0)  10^3/uL


 


Lymph # (Auto)  1.1   (1.0-4.0)  10^3/uL


 


Mono # (Auto)  0.9 H   (0.1-0.8)  10^3/uL


 


Eos # (Auto)  0.2   (0.1-0.3)  10^3/uL


 


Baso # (Auto)  0.0   (0.0-0.1)  10^3/uL


 


ESR  101 H   (0-20)  mm/hr


 


Sodium   139  (136-145)  mmol/L


 


Potassium   3.4  (3.3-5.3)  mmol/L


 


Chloride   104  ()  mmol/L


 


Carbon Dioxide   24.7  (21.0-32.0)  mmol/L


 


BUN   8  (6-25)  mg/dL


 


Creatinine   0.54  (0.51-1.17)  mg/dL


 


Est Cr Clr Drug Dosing   92.05  mL/min


 


Estimated GFR (MDRD)   > 60  mL/min


 


Glucose   98  ()  mg/dL


 


Calcium   8.3 L  (8.7-10.3)  mg/dL











Daniel Results Last 24 Hours: 


 Microbiology











 02/21/18 18:45 Urine Culture - Final





 Urine, Voided    NO GROWTH AFTER 2 DAYS











Med Orders - Current: 


 Current Medications





Acetaminophen (Tylenol Extra Strength)  1,000 mg PO Q4H PRN


   PRN Reason: Fever


   Last Admin: 02/24/18 22:50 Dose:  1,000 mg


Ceftriaxone Sodium (Rocephin)  1 gm IVPUSH Q24H [facility]


   Last Admin: 02/24/18 16:12 Dose:  1 gm


Cholecalciferol (Vitamin D3)  2,000 units PO DAILY [facility]


   Last Admin: 02/25/18 08:47 Dose:  2,000 units


Cyanocobalamin (Vitamin B12)  500 mcg PO DAILY [facility]


   Last Admin: 02/25/18 08:47 Dose:  500 mcg


Enoxaparin Sodium (Lovenox)  40 mg SUBCUT DAILY [facility]


   Last Admin: 02/25/18 08:48 Dose:  40 mg


Ferrous Sulfate (Ferrous Sulfate)  325 mg PO DAILY [facility]


   Last Admin: 02/25/18 08:47 Dose:  325 mg


Sodium Chloride (Normal Saline)  100 mls @ 100 mls/hr IV 1700 [facility]


   Last Admin: 02/25/18 16:03 Dose:  Not Given


Lorazepam (Ativan)  0.5 mg PO BEDTIME LOAN


   Last Admin: 02/24/18 21:02 Dose:  0.5 mg


(Dasatinib [Sprycel] (100 Mg)*Pt Own Med*)  100 mg PO BEDTIME LOAN


   Last Admin: 02/21/18 20:08 Dose:  100 mg


(Etodolac [Etodolac] (500 Mg)*Pt Own Med*)  500 mg PO DAILY LOAN


   Last Admin: 02/25/18 08:47 Dose:  500 mg


Oxycodone HCl (Oxycontin)  20 mg PO BEDTIME [facility]


   Last Admin: 02/24/18 21:02 Dose:  20 mg


Polyethylene Glycol (Miralax)  17 gm PO DAILY PRN


   PRN Reason: Constipation


Ranitidine HCl (Zantac)  150 mg PO BIDAC [facility]


   Last Admin: 02/25/18 07:29 Dose:  150 mg


Sodium Chloride (Syrex Flush)  5 ml FLUSH Q8HR PRN


   PRN Reason: Keep Vein Open


   Last Admin: 02/25/18 08:46 Dose:  5 ml


Triamcinolone Acetonide (Triamcinolone Acetonide 0.1% Oint)  0.5 gm TOP BID PRN


   PRN Reason: Itching


   Last Admin: 02/24/18 16:37 Dose:  1 applic





Discontinued Medications





Acetaminophen (Tylenol)  650 mg PO NOW ONE


   Stop: 02/21/18 15:45


   Last Admin: 02/21/18 16:00 Dose:  650 mg


Ceftriaxone Sodium (Rocephin)  1 gm IVPUSH ONETIME ONE


   Stop: 02/21/18 17:21


   Last Admin: 02/21/18 17:27 Dose:  1 gm


Diphenhydramine HCl (Benadryl)  25 mg PO ONETIME ONE


   Stop: 02/22/18 05:45


   Last Admin: 02/22/18 06:05 Dose:  25 mg


Sodium Chloride (Normal Saline)  500 mls @ 999 mls/hr IV .BOLUS ONE


   Stop: 02/21/18 16:09


   Last Infusion: 02/21/18 16:29 Dose:  Infused


Sodium Chloride (Normal Saline)  500 mls @ 500 mls/hr IV ASDIRECTED [facility]


   Last Admin: 02/21/18 16:53 Dose:  500 mls/hr


Sodium Chloride (Normal Saline) Confirm Administered Dose 500 mls @ as directed 

.ROUTE .STK-MED ONE


   Stop: 02/21/18 16:46


   Last Admin: 02/21/18 16:50 Dose:  Not Given


Azithromycin 500 mg/ Sodium (Chloride)  250 mls @ 250 mls/hr IV ONETIME ONE


   Stop: 02/21/18 18:19


   Last Admin: 02/21/18 18:05 Dose:  250 mls/hr


Dextrose/Sodium Chloride (Dextrose 5%-1/2 Ns)  1,000 mls @ 75 mls/hr IV 

ASDIRECTED LOAN


Dextrose/Sodium Chloride (Dextrose 5%-1/2 Ns)  1,000 mls @ 50 mls/hr IV 

ASDIRECTED [facility]


   Last Admin: 02/22/18 14:59 Dose:  50 mls/hr


Azithromycin 500 mg/ Sodium (Chloride)  250 mls @ 250 mls/hr IV Q24H [facility]


   Last Admin: 02/24/18 16:32 Dose:  250 mls/hr


Sodium Chloride (Normal Saline)  100 mls @ 100 mls/hr IV ASDIRECTED [facility]


Ibuprofen (Motrin)  600 mg PO ONETIME ONE


   Stop: 02/21/18 19:03


   Last Admin: 02/21/18 19:08 Dose:  600 mg


Methylprednisolone Sodium Succinate (Solu-Medrol)  40 mg IVPUSH ONETIME ONE


   Stop: 02/22/18 05:50


   Last Admin: 02/22/18 06:05 Dose:  40 mg


Ondansetron HCl (Zofran)  4 mg IVPUSH ONETIME ONE


   Stop: 02/21/18 15:43


   Last Admin: 02/21/18 15:58 Dose:  4 mg


Potassium Chloride (Klor-Con M20)  40 meq PO ONETIME ONE


   Stop: 02/24/18 11:25


   Last Admin: 02/24/18 11:44 Dose:  40 meq


Ranitidine HCl (Zantac)  150 mg PO BID [facility]


   Last Admin: 02/22/18 09:59 Dose:  150 mg


Sodium Chloride (Syrex Flush)  5 ml FLUSH Q8HR PRN


   PRN Reason: Keep Vein Open











- Exam


Physical Findings Comments:: 


GENERAL: Well-appearing adult female lying in hospital bed in no acute distress.


HEENT: Normocephalic, atraumatic.  Conjunctiva clear.  Nares patent without 

discharge.  Mucous membranes moist.


CV: Regular rate and rhythm, no murmurs, rubs, or gallops.  2+ radial pulses.


PULMONARY: Normal effort, clear to auscultation bilaterally, no wheezes, rales, 

or rhonchi.


ABDOMEN: Positive bowel sounds, soft, nontender, nondistended, no 

hepatosplenomegaly.


EXTREMITIES: No edema, cyanosis, or clubbing.


MUSCULOSKELETAL: Moves all extremities well.


NEUROLOGICAL: No obvious deficits.


DERMATOLOGIC: R thigh and L breast with diffuse erythema within markings. 

Remainder of extremities and torso without rash. No petechiae, splinter 

hemorrhages, or subcutaneous nodules.


PSYCHIATRIC: Alert, interactive, appropriate affect.





- Problem List Review


Problem List Initiated/Reviewed/Updated: Yes





- My Orders


Last 24 Hours: 


My Active Orders





02/25/18 12:15


CULTURE BLOOD [BC] Routine 





02/25/18 12:25


CULTURE BLOOD [BC] Routine 





02/25/18 17:00


Sodium Chloride 0.9% [Normal Saline] 100 ml IV 1700 





02/26/18 05:11


BASIC METABOLIC PANEL,BMP [CHEM] AM 


CBC WITH AUTO DIFF [HEME] AM 


SEDIMENTATION RATE MANUAL [HEME] AM 














- Plan


Plan:: 


67yoF with history notable for CML admitted for pneumonia following 

presentation with fever and worsening respiratory symptoms which started 2 

weeks prior, and worsening diffuse pruritic rash for which she had a month 

prior and spontaneously resolved. Workup was notable for WBC 20, lactate 2.2, 

CXR with mild bilateral interstitial infiltrates vs. edema, and negative 

influenza testing. She was admitted for pneumonia and started on ceftriaxone 

and azithromycin. Blood cultures became positive with Group C Streptococcus. 

She has continued to make steady clinical improvement. 





# Pneumonia, community acquired: Presentation as above. Streptococcal. Ongoing 

clinical and laboratory improvement. No further respiratory compromise. See 

antibiotic adjustments below.





# Bacteremia: Lactic acid 2.2 on admission before hydration. Afebrile since 2/ 21 at 1900. Both cultures growing pansensitive Group C Streptococcus. Repeat 

cultures obtained. Discontinue azithromycin. Plan to change to oral regimen of 

penicillin tomorrow to complete 14 day course of antibiotics following 5 days 

of IV antibiotics. Will obtain echo next week given recurrent fevers and 

possibility of endocarditis, though reassuring clinical status without murmur 

or other vascular or immunologic findings. If evidence of endocarditis, would 

need to extend treatment course to at least 28 days.  





# Rash: RLE, back, left breast. Thought to be hyperinflammatory response 

related to infection, but may be infectious in the setting of the Group C 

Streptococcus on blood culture. CML with possible contribution and holding 

Sprycel. Improving. Continue monitoring.





# Neutrophilia: Improving.





# Anemia, normocytic normochromic: Not present on admission. Likely infection 

related with WBC > RBC stem-cell production. Stable.





# Thrombocytosis: Likely reactive. Stable.





# Asymptomatic bacteruria: No indication for treatment, though likely covered 

by abx as above.





# Chronic myelogenous leukemia: Dx 2007, followed by oncology. Holding Sprycel; 

plan to restart at discharge.





# Debility: Minimal physical activity during hospitalization thus far. 

Encouraged ambulation with nursing today. If poor tolerance, will consult 

physical therapy tomorrow.





Other chronic conditions:


# GERD: Ranitidine.


# Obesity


# Hx gastric bypass: Vitamin B12, iron.


# Osteopenia: Ca/D.


# Hx laminectomy, spondylolisthesis, and chronic back pain: Oxycodone (MEDD 60mg

), etodolac, Tylenol prn.


# Anxiety: Lorazepam prn.





Hospitalization details:


# FEN: No IVF. Electrolytes normal; recheck tomorrow. Regular diet.


# PPX: Enoxaparin for DVT ppx.


# Code status: DNR/DNI.


# Emergency contact: , Nicholas. 


# Disposition: Continue on inpatient with plan to discharge to home tomorrow 

following 5 day IV antibiotic course for bacteremia.

## 2018-02-26 VITALS — SYSTOLIC BLOOD PRESSURE: 148 MMHG | DIASTOLIC BLOOD PRESSURE: 70 MMHG

## 2018-02-26 LAB
CHLORIDE SERPL-SCNC: 105 MMOL/L (ref 98–115)
SODIUM SERPL-SCNC: 140 MMOL/L (ref 136–145)

## 2018-02-26 RX ADMIN — Medication PRN ML: at 07:24

## 2018-02-26 RX ADMIN — CYANOCOBALAMIN TAB 500 MCG SCH MCG: 500 TAB at 08:22

## 2018-02-26 RX ADMIN — VITAMIN D, TAB 1000IU (100/BT) SCH UNITS: 25 TAB at 08:22

## 2018-02-26 NOTE — PCM.DCSUM1
**Discharge Summary





- Discharge Data


Discharge Disposition: Home, Self-Care 01


Condition: Good





- Discharge Plan


Home Medications: 


 Home Meds





Dasatinib [Sprycel] 100 mg PO BEDTIME 01/17/17 [History]


Etodolac [Etodolac] 500 mg PO DAILY 01/17/17 [History]


LORazepam [Ativan] 0.5 mg PO BEDTIME 01/17/17 [History]


oxyCODONE ER [OxyCONTIN] 20 mg PO BEDTIME 01/17/17 [History]


Cholecalciferol (Vitamin D3) [Vitamin D3] 2,000 units PO DAILY 02/21/18 [History

]


Cyanocobalamin (Vitamin B12) [Vitamin B12] 500 mcg PO DAILY 02/21/18 [History]


Ferrous Sulfate 325 mg PO DAILY 02/21/18 [History]


Multivitamin [Multivitamins] 1 cap PO DAILY 02/21/18 [History]


Ranitidine HCl [Ranitidine] 150 mg PO BIDAC 02/21/18 [History]


Triamcinolone Acetonide [Triamcinolone Acetonide 0.025% Crm] 1 applic TOP DAILY 

PRN 02/22/18 [History]








Forms:  ED Department Discharge


Referrals: 


PCP,Unobtain [Ordering Only Provider] - 





- Patient Data


Vitals - Most Recent: 


 Last Vital Signs











Temp  36.9 C   02/26/18 07:00


 


Pulse  78   02/26/18 07:00


 


Resp  18   02/26/18 07:00


 


BP  148/70 H  02/26/18 07:00


 


Pulse Ox  97   02/26/18 08:15











Weight - Most Recent: 120.259 kg


I&O - Last 24 hours: 


 Intake & Output











 02/25/18 02/26/18 02/26/18





 22:59 06:59 14:59


 


Intake Total 800 250 


 


Output Total 800 1000 


 


Balance 0 -750 











Lab Results - Last 24 hrs: 


 Laboratory Results - last 24 hr











  02/26/18 02/26/18 Range/Units





  07:10 07:10 


 


WBC  7.5   (5.0-10.0)  10^3/uL


 


RBC  3.60 L   (3.80-5.50)  10^6/uL


 


Hgb  10.6 L   (12.0-16.0)  g/dL


 


Hct  32.0 L   (37.0-47.0)  %


 


MCV  88.8   (82.0-92.0)  fL


 


MCH  29.5   (27.0-31.0)  pg


 


MCHC  33.3   (32.0-36.0)  g/dL


 


RDW  13.6   (11.5-14.5)  %


 


Plt Count  424 H   (150-300)  10^3/uL


 


MPV  7.3 L   (7.4-10.4)  fL


 


Neut % (Auto)  64.9   (50.0-70.0)  %


 


Lymph % (Auto)  18.4 L   (20.0-40.0)  %


 


Mono % (Auto)  12.8 H   (2.0-8.0)  %


 


Eos % (Auto)  3.5 H   (1.0-3.0)  %


 


Baso % (Auto)  0.4   (0.0-1.0)  %


 


Neut # (Auto)  4.8   (2.5-7.0)  10^3/uL


 


Lymph # (Auto)  1.4   (1.0-4.0)  10^3/uL


 


Mono # (Auto)  1.0 H   (0.1-0.8)  10^3/uL


 


Eos # (Auto)  0.3   (0.1-0.3)  10^3/uL


 


Baso # (Auto)  0.0   (0.0-0.1)  10^3/uL


 


ESR  96 H   (0-20)  mm/hr


 


Sodium   140  (136-145)  mmol/L


 


Potassium   3.2 L  (3.3-5.3)  mmol/L


 


Chloride   105  ()  mmol/L


 


Carbon Dioxide   27.0  (21.0-32.0)  mmol/L


 


BUN   7  (6-25)  mg/dL


 


Creatinine   0.59  (0.51-1.17)  mg/dL


 


Est Cr Clr Drug Dosing   84.25  mL/min


 


Estimated GFR (MDRD)   > 60  mL/min


 


Glucose   102  ()  mg/dL


 


Calcium   8.5 L  (8.7-10.3)  mg/dL











Med Orders - Current: 


 Current Medications





Acetaminophen (Tylenol Extra Strength)  1,000 mg PO Q4H PRN


   PRN Reason: Fever


   Last Admin: 02/25/18 21:53 Dose:  1,000 mg


Ceftriaxone Sodium (Rocephin)  1 gm IVPUSH Q24H LOAN


   Last Admin: 02/25/18 16:19 Dose:  1 gm


Cholecalciferol (Vitamin D3)  2,000 units PO DAILY [facility]


   Last Admin: 02/26/18 08:22 Dose:  2,000 units


Cyanocobalamin (Vitamin B12)  500 mcg PO DAILY [facility]


   Last Admin: 02/26/18 08:22 Dose:  500 mcg


Enoxaparin Sodium (Lovenox)  40 mg SUBCUT DAILY [facility]


   Last Admin: 02/26/18 08:23 Dose:  40 mg


Ferrous Sulfate (Ferrous Sulfate)  325 mg PO DAILY [facility]


   Last Admin: 02/26/18 08:22 Dose:  325 mg


Sodium Chloride (Normal Saline)  100 mls @ 100 mls/hr IV 1700 [facility]


   Last Admin: 02/25/18 16:03 Dose:  Not Given


Lorazepam (Ativan)  0.5 mg PO BEDTIME [facility]


   Last Admin: 02/25/18 21:49 Dose:  0.5 mg


(Dasatinib [Sprycel] (100 Mg)*Pt Own Med*)  100 mg PO BEDTIME [facility]


   Last Admin: 02/25/18 21:52 Dose:  Not Given


(Etodolac [Etodolac] (500 Mg)*Pt Own Med*)  500 mg PO DAILY [facility]


   Last Admin: 02/26/18 08:23 Dose:  500 mg


Oxycodone HCl (Oxycontin)  20 mg PO BEDTIME [facility]


   Last Admin: 02/25/18 21:49 Dose:  20 mg


Polyethylene Glycol (Miralax)  17 gm PO DAILY PRN


   PRN Reason: Constipation


Potassium Chloride (Klor-Con M20)  40 meq PO ONETIME ONE


   Stop: 02/26/18 09:31


Ranitidine HCl (Zantac)  150 mg PO BIDAC [facility]


   Last Admin: 02/26/18 07:24 Dose:  150 mg


Sodium Chloride (Syrex Flush)  5 ml FLUSH Q8HR PRN


   PRN Reason: Keep Vein Open


   Last Admin: 02/26/18 07:24 Dose:  5 ml


Triamcinolone Acetonide (Triamcinolone Acetonide 0.1% Oint)  0.5 gm TOP BID PRN


   PRN Reason: Itching


   Last Admin: 02/24/18 16:37 Dose:  1 applic





Discontinued Medications





Acetaminophen (Tylenol)  650 mg PO NOW ONE


   Stop: 02/21/18 15:45


   Last Admin: 02/21/18 16:00 Dose:  650 mg


Ceftriaxone Sodium (Rocephin)  1 gm IVPUSH ONETIME ONE


   Stop: 02/21/18 17:21


   Last Admin: 02/21/18 17:27 Dose:  1 gm


Diphenhydramine HCl (Benadryl)  25 mg PO ONETIME ONE


   Stop: 02/22/18 05:45


   Last Admin: 02/22/18 06:05 Dose:  25 mg


Sodium Chloride (Normal Saline)  500 mls @ 999 mls/hr IV .BOLUS ONE


   Stop: 02/21/18 16:09


   Last Infusion: 02/21/18 16:29 Dose:  Infused


Sodium Chloride (Normal Saline)  500 mls @ 500 mls/hr IV ASDIRECTED [facility]


   Last Admin: 02/21/18 16:53 Dose:  500 mls/hr


Sodium Chloride (Normal Saline) Confirm Administered Dose 500 mls @ as directed 

.ROUTE .STK-MED ONE


   Stop: 02/21/18 16:46


   Last Admin: 02/21/18 16:50 Dose:  Not Given


Azithromycin 500 mg/ Sodium (Chloride)  250 mls @ 250 mls/hr IV ONETIME ONE


   Stop: 02/21/18 18:19


   Last Admin: 02/21/18 18:05 Dose:  250 mls/hr


Dextrose/Sodium Chloride (Dextrose 5%-1/2 Ns)  1,000 mls @ 75 mls/hr IV 

ASDIRECTED [facility]


Dextrose/Sodium Chloride (Dextrose 5%-1/2 Ns)  1,000 mls @ 50 mls/hr IV 

ASDIRECTED [facility]


   Last Admin: 02/22/18 14:59 Dose:  50 mls/hr


Azithromycin 500 mg/ Sodium (Chloride)  250 mls @ 250 mls/hr IV Q24H [facility]


   Last Admin: 02/24/18 16:32 Dose:  250 mls/hr


Sodium Chloride (Normal Saline)  100 mls @ 100 mls/hr IV ASDIRECTED [facility]


Ibuprofen (Motrin)  600 mg PO ONETIME ONE


   Stop: 02/21/18 19:03


   Last Admin: 02/21/18 19:08 Dose:  600 mg


Methylprednisolone Sodium Succinate (Solu-Medrol)  40 mg IVPUSH ONETIME ONE


   Stop: 02/22/18 05:50


   Last Admin: 02/22/18 06:05 Dose:  40 mg


Ondansetron HCl (Zofran)  4 mg IVPUSH ONETIME ONE


   Stop: 02/21/18 15:43


   Last Admin: 02/21/18 15:58 Dose:  4 mg


Potassium Chloride (Klor-Con M20)  40 meq PO ONETIME ONE


   Stop: 02/24/18 11:25


   Last Admin: 02/24/18 11:44 Dose:  40 meq


Ranitidine HCl (Zantac)  150 mg PO BID LOAN


   Last Admin: 02/22/18 09:59 Dose:  150 mg


Sodium Chloride (Syrex Flush)  5 ml FLUSH Q8HR PRN


   PRN Reason: Keep Vein Open











*Q Meaningful Use (DIS)





- VTE *Q


VTE Criteria *Q: 








- Stroke *Q


Stroke Criteria *Q: 








- AMI *Q


AMI Criteria *Q:

## 2018-09-17 ENCOUNTER — HOSPITAL ENCOUNTER (OUTPATIENT)
Dept: HOSPITAL 77 - KA.CT | Age: 68
Setting detail: OBSERVATION
LOS: 1 days | Discharge: HOME | End: 2018-09-18
Attending: NURSE PRACTITIONER | Admitting: NURSE PRACTITIONER
Payer: COMMERCIAL

## 2018-09-17 DIAGNOSIS — R10.13: Primary | ICD-10-CM

## 2018-09-17 DIAGNOSIS — E66.9: ICD-10-CM

## 2018-09-17 DIAGNOSIS — M54.6: ICD-10-CM

## 2018-09-17 DIAGNOSIS — Z79.899: ICD-10-CM

## 2018-09-17 DIAGNOSIS — G89.29: ICD-10-CM

## 2018-09-17 DIAGNOSIS — K21.9: ICD-10-CM

## 2018-09-17 DIAGNOSIS — K59.00: ICD-10-CM

## 2018-09-17 DIAGNOSIS — F41.9: ICD-10-CM

## 2018-09-17 PROCEDURE — G0378 HOSPITAL OBSERVATION PER HR: HCPCS

## 2018-09-17 RX ADMIN — OXYCODONE HYDROCHLORIDE SCH MG: 10 TABLET, FILM COATED, EXTENDED RELEASE ORAL at 22:02

## 2018-09-18 VITALS — DIASTOLIC BLOOD PRESSURE: 83 MMHG | SYSTOLIC BLOOD PRESSURE: 168 MMHG

## 2018-09-18 RX ADMIN — OXYCODONE HYDROCHLORIDE SCH MG: 10 TABLET, FILM COATED, EXTENDED RELEASE ORAL at 08:59

## 2018-09-19 NOTE — PCM.DCSUM1
**Discharge Summary





- Hospital Course


Diagnosis: Stroke: No





- Discharge Data


Discharge Date: 09/18/18


Discharge Disposition: Home, Self-Care 01


Condition: Good





- Patient Instructions


Diet: Usual Diet as Tolerated, Drink 8-10+ Glasses/Day


Driving: [place] [address] Today


Showering/Bathing: May Shower


Notify Provider of: Fever, Increased Pain, Nausea and/or Vomiting


Other/Special Instructions: Milk of magnesia as directed.  Take 1 ounce daily 

until follow-up





- Discharge Plan


*PRESCRIPTION DRUG MONITORING PROGRAM REVIEWED*: Not Applicable


*COPY OF PRESCRIPTION DRUG MONITORING REPORT IN PATIENT MARY: Not Applicable


Home Medications: 


 Home Meds





Dasatinib [Sprycel] 100 mg PO BEDTIME 01/17/17 [History]


LORazepam [Ativan] 0.5 mg PO BEDTIME 01/17/17 [History]


oxyCODONE ER [OxyCONTIN] 20 mg PO BID 01/17/17 [History]


Cholecalciferol (Vitamin D3) [Vitamin D3] 2,000 units PO QAM 02/21/18 [History]


Cyanocobalamin (Vitamin B12) [Vitamin B12] 500 mcg PO QAM 02/21/18 [History]


Multivitamin [Multivitamins] 1 cap PO QAM 02/21/18 [History]


Ranitidine HCl [Ranitidine] 150 mg PO BIDAC 02/21/18 [History]


Ferrous Sulfate 325 mg PO Q48H #0 09/18/18 [Rx]








Referrals: 


Vanesa Pretty NP [Primary Care Provider] -  (Late this week)





- Discharge Summary/Plan Comment


DC Time >30 min.: Yes


Discharge Summary/Plan Comment: 


Final diagnosis


Constipation, improving


Impression deformity, mild,


DDD, s/p fusion





Brief history


Kyra is a 68-year-old pleasant female who was admitted by Vanesa Pretty NP 

[facility] due to ongoing constant epigastric and back pain.  When she 

reported to the [facility] she was complaining of about days of 5 sharp 

pains in her abdominal and back regions anorexia disturbed sleep.  Though she 

did deny any constipation there is some evidence of bowel stool burden.   She 

has taken Oxycontin twice a day without relief. Labs at [facility] on 

previous workup dated September 14 was unremarkable. Hx of appendectomy, 

cholecystectomy, total hysterectomy, and is status post gastric bypass.  She 

was admitted to observation for fluids, CT of the abdomen, pain control and anti

-emetics 





Pertinent workup


WBC 5.4. Hgb 13.2. Amylase 32. CMP unremarkable.


CT the abdomen--constipation mid small bowel


Mild compression deformity of L1. 





Hospital course


Overnight stay quite uneventful.  Patient did start having bowel movements 

which relieved her pain.  Patient was pain-free upon morning assessment and was 

ready for discharge.  Out of signs stable.  Afebrile.  Tolerated morning added 

breakfast.  No vomiting.  No fever.





Medication changes/adjustments on discharge


Milk of magnesia, 1 ounce by mouth daily scheduled until loose stools then 

discontinue when necessary





Disposition/follow-up


Patient will be discharged from observation.  Self-care.  Report worsening pain

, vomiting, fever,  follow-up with Vanesa Pretty nurse practitioner [facility]











 





- Patient Data


Vitals - Most Recent: 


 Last Vital Signs











Temp  98.9 F   09/18/18 10:36


 


Pulse  84   09/18/18 10:36


 


Resp  18   09/18/18 10:36


 


BP  168/83 H  09/18/18 10:36


 


Pulse Ox  96   09/18/18 10:36











Weight - Most Recent: 251 lb 11.2 oz


Med Orders - Current: 


 Current Medications








Discontinued Medications





Hydromorphone HCl (Dilaudid)  1 - 2 mg IVPUSH Q1H PRN


   PRN Reason: Pain


Sodium Chloride (Normal Saline)  50 mls @ 200 mls/hr IV ASDIRECTED [facility]


   Last Admin: 09/17/18 18:15 Dose:  200 mls/hr


Sodium Chloride (Normal Saline)  1,000 mls @ 100 mls/hr IV ASDIRECTED [facility]


   Last Admin: 09/18/18 03:31 Dose:  100 mls/hr


Iopamidol (Isovue-300 (61%))  75 ml IV ONETIME ONE


   Stop: 09/17/18 17:48


   Last Admin: 09/17/18 18:15 Dose:  75 ml


Lactulose (Cephulac)  30 gm PO ONETIME ONE


   Stop: 09/18/18 08:01


   Last Admin: 09/18/18 07:49 Dose:  30 gm


Lorazepam (Ativan)  0.5 mg PO BEDTIME [facility]


   Last Admin: 09/17/18 22:03 Dose:  0.5 mg


Magnesium Hydroxide (Milk Of Magnesia)  30 ml PO Q12H PRN


   PRN Reason: Constipation


   Last Admin: 09/17/18 20:03 Dose:  30 ml


Metoclopramide HCl (Reglan)  10 mg IVPUSH Q6H [facility]


   Last Admin: 09/18/18 07:50 Dose:  Not Given


Naloxegol (Movantik)  25 mg PO DAILY [facility]


   Last Admin: 09/18/18 08:59 Dose:  25 mg


Non-Formulary Medication (Dasatinib [Sprycel])  100 mg PO BEDTIME [facility]


Ondansetron HCl (Zofran)  4 mg IVPUSH Q4H PRN


   PRN Reason: Nausea/Vomiting


Oxycodone HCl (Oxycontin)  20 mg PO BID [facility]


   Last Admin: 09/18/18 08:59 Dose:  20 mg

## 2019-04-11 ENCOUNTER — HOSPITAL ENCOUNTER (EMERGENCY)
Dept: HOSPITAL 77 - KA.ED | Age: 69
Discharge: HOME | End: 2019-04-11
Payer: COMMERCIAL

## 2019-04-11 VITALS — SYSTOLIC BLOOD PRESSURE: 160 MMHG | DIASTOLIC BLOOD PRESSURE: 60 MMHG

## 2019-04-11 DIAGNOSIS — F41.9: ICD-10-CM

## 2019-04-11 DIAGNOSIS — K21.9: ICD-10-CM

## 2019-04-11 DIAGNOSIS — J40: Primary | ICD-10-CM

## 2019-04-11 DIAGNOSIS — Z88.5: ICD-10-CM

## 2019-04-11 DIAGNOSIS — Z79.899: ICD-10-CM

## 2019-04-11 NOTE — CR
______________________________________________________________________________   

  

[id number] RAD/RAD Chest PA And Lateral  

EXAM:  RAD Chest PA And Lateral  

   

 INDICATION:  FEVER, INCREASED RESPIRATION.  

   

 COMPARISON:  February 21, 2018.  

   

 DISCUSSION:    

   

 Cardiomediastinal silhouette is stable in size and contour.  

   

 Mild bilateral interstitial edema and/or infiltrates are again suggested though  

 less prominent when compared to the prior study.  

   

 IMPRESSION:  

 Mild bilateral interstitial infiltrates and/or edema  

   

 Electronically signed by Fito Sánchez MD on 4/11/2019 8:42 AM  

   

  

Fito Sánchez DO                 

 04/11/19 0844    

  

Thank you for allowing us to participate in the care of your patient.

## 2019-04-11 NOTE — EDM.PDOC
ED HPI GENERAL MEDICAL PROBLEM





- General


Chief Complaint: Fever


Stated Complaint: FEVER,COUGH SYMPTOMS


Time Seen by Provider: 04/11/19 09:02


Source of Information: Reports: Patient


History Limitations: Reports: No Limitations





- History of Present Illness


INITIAL COMMENTS - FREE TEXT/NARRATIVE: 





Patient is a 68-year-old female who presents to the emergency department this 

morning with a complaint of cough and fever.  Patient states that cough began 

on Monday, and when she woke this morning she had 101 temperature, became more 

concerned and decided to present to the emergency department.  Patient denies 

chest pain, shortness of breath, nausea, vomiting, diarrhea, abdominal pain, 

contact with persons with similar symptoms, or out of country travel.


Onset: Gradual


Onset Date: 04/08/19


Duration: Day(s):


Severity: Mild


Improves with: Reports: None


Worsens with: Reports: None


Associated Symptoms: Reports: Cough, cough w sputum, Fever/Chills.  Denies: 

Chest Pain, Nausea/Vomiting, Shortness of Breath


Treatments PTA: Reports: Acetaminophen





- Related Data


 Allergies











Allergy/AdvReac Type Severity Reaction Status Date / Time


 


morphine Allergy  Rash Verified 04/11/19 08:03











Home Meds: 


 Home Meds





Dasatinib [Sprycel] 100 mg PO BEDTIME 01/17/17 [History]


LORazepam [Ativan] 0.5 mg PO BEDTIME 01/17/17 [History]


oxyCODONE ER [OxyCONTIN] 20 mg PO BID 01/17/17 [History]


Cholecalciferol (Vitamin D3) [Vitamin D3] 2,000 units PO QAM 02/21/18 [History]


Cyanocobalamin (Vitamin B12) [Vitamin B12] 500 mcg PO QAM 02/21/18 [History]


Multivitamin [Multivitamins] 1 cap PO QAM 02/21/18 [History]


Ranitidine HCl [Ranitidine] 150 mg PO BIDAC 02/21/18 [History]


Azithromycin [Zithromax] 500 mg PO DAILY #3 tab 04/11/19 [Rx]


Ferrous Sulfate 325 mg PO DAILY 04/11/19 [History]











Past Medical History


HEENT History: Reports: Impaired Vision


Gastrointestinal History: Reports: GERD


Genitourinary History: Reports: None


OB/GYN History: Reports: Pregnancy


Musculoskeletal History: Reports: Back Pain, Chronic


Neurological History: Reports: Headaches, Chronic, Migraines


Psychiatric History: Reports: Anxiety


Endocrine/Metabolic History: Reports: Obesity/BMI 30+


Hematologic History: Reports: Blood Transfusion(s)


Oncologic (Cancer) History: Reports: Leukemia, Other (See Below)


Other Oncologic History: Leukemia diagnosed 2007; takes spiracel po daily.





- Infectious Disease History


Infectious Disease History: Reports: Chicken Pox, Measles, Mumps, Rubella





- Past Surgical History


Head Surgeries/Procedures: Reports: None


HEENT Surgical History: Reports: Tonsillectomy


GI Surgical History: Reports: Appendectomy, Bariatric Procedure, Cholecystectomy

, Colonoscopy, ERCP


Female  Surgical History: Reports: Hysterectomy


Endocrine Surgical History: Reports: None


Neurological Surgical History: Reports: Spinal Fusion


Musculoskeletal Surgical History: Reports: Arthroscopic Knee, Other (See Below)


Other Musculoskeletal Surgeries/Procedures:: bone taken from hip and placed in 

lower back


Oncologic Surgical History: Reports: Bone Marrow Aspiration


Dermatological Surgical History: Reports: None





Social & Family History





- Family History


Family Medical History: Noncontributory





- Tobacco Use


Smoking Status *Q: Never Smoker


Second Hand Smoke Exposure: No





- Caffeine Use


Caffeine Use: Reports: Coffee, Soda


Other Caffeine Use: diet





- Recreational Drug Use


Recreational Drug Use: No





ED ROS GENERAL





- Review of Systems


Review Of Systems: ROS reveals no pertinent complaints other than HPI.


Constitutional: Reports: Fever, Chills


HEENT: Reports: No Symptoms


Respiratory: Reports: Cough, Sputum


Cardiovascular: Reports: No Symptoms


Endocrine: Reports: No Symptoms


GI/Abdominal: Reports: No Symptoms


: Reports: No Symptoms


Musculoskeletal: Reports: No Symptoms


Skin: Reports: No Symptoms


Neurological: Reports: No Symptoms


Psychiatric: Reports: No Symptoms


Hematologic/Lymphatic: Reports: No Symptoms


Immunologic: Reports: No Symptoms





ED EXAM, GENERAL





- Physical Exam


Exam: See Below


Exam Limited By: No Limitations


General Appearance: Alert, WD/WN, No Apparent Distress


Eye Exam: Bilateral Eye: Normal Inspection


Ears: Normal External Exam, Normal Canal, Normal TMs


Nose: Normal Inspection, No Blood, Clear Rhinorrhea


Throat/Mouth: Normal Inspection, Normal Oropharynx, No Airway Compromise


Head: Atraumatic, Normocephalic


Neck: Normal Inspection, Supple, Non-Tender


Respiratory/Chest: No Respiratory Distress, Lungs Clear, Normal Breath Sounds, 

No Accessory Muscle Use


Cardiovascular: Regular Rate, Rhythm, No Murmur


GI/Abdominal: Normal Bowel Sounds, Soft, Non-Tender


Extremities: Normal Inspection, No Pedal Edema


Neurological: Alert, Oriented, Normal Cognition


Psychiatric: Normal Affect, Normal Mood


Skin Exam: Warm, Dry, Intact, Normal Color, No Rash


Lymphatic: No Adenopathy





Course





- Vital Signs


Last Recorded V/S: 


 Last Vital Signs











Temp  99.9 F   04/11/19 07:54


 


Pulse  97   04/11/19 07:54


 


Resp  32 H  04/11/19 07:54


 


BP  160/60 H  04/11/19 07:54


 


Pulse Ox  94 L  04/11/19 07:54














- Orders/Labs/Meds


Meds: 


Medications














Discontinued Medications














Generic Name Dose Route Start Last Admin





  Trade Name Maria Dolores  PRN Reason Stop Dose Admin


 


Guaifenesin/Codeine Phosphate  5 ml  04/11/19 09:02  04/11/19 09:05





  Robitussin Ac  PO  04/11/19 09:03  5 ml





  ONETIME ONE   Administration





     





     





     





     














- Radiology Interpretation


Free Text/Narrative:: 





Chest x-ray shows bilateral hilar congestion





- Re-Assessments/Exams


Free Text/Narrative Re-Assessment/Exam: 





04/11/19 09:14


Patient afebrile, vital signs stable, Robitussin with codeine given in ER for 

symptom relief.  Influenza negative.  Prescription for Zithromax given.


04/11/19 09:15








Departure





- Departure


Time of Disposition: 09:15


Disposition: Home, Self-Care 01


Condition: Good


Clinical Impression: 


 Bronchitis








- Discharge Information


Instructions:  Upper Respiratory Infection, Adult, Easy-to-Read, Fever, Adult, 

Easy-to-Read


Referrals: 


Genesis Crabtree PA-C [Primary Care Provider] - 


Forms:  ED Department Discharge


Additional Instructions: 


Follow-up at [facility] in 2-3 days.  Return to emergency department sooner 

if symptoms continue or worsen.  Take medication as directed.





- Assessment/Plan


Assessment:: 





Bronchitis


Plan: 





Follow-up at [facility]

## 2025-06-08 ENCOUNTER — HOSPITAL ENCOUNTER (EMERGENCY)
Dept: HOSPITAL 77 - KA.ED | Age: 75
Discharge: HOME | End: 2025-06-08
Payer: MEDICARE

## 2025-06-08 VITALS — SYSTOLIC BLOOD PRESSURE: 113 MMHG | DIASTOLIC BLOOD PRESSURE: 55 MMHG | HEART RATE: 81 BPM

## 2025-06-08 DIAGNOSIS — Z90.49: ICD-10-CM

## 2025-06-08 DIAGNOSIS — Z90.710: ICD-10-CM

## 2025-06-08 DIAGNOSIS — Z91.048: ICD-10-CM

## 2025-06-08 DIAGNOSIS — E66.9: ICD-10-CM

## 2025-06-08 DIAGNOSIS — Z88.5: ICD-10-CM

## 2025-06-08 DIAGNOSIS — K21.9: ICD-10-CM

## 2025-06-08 DIAGNOSIS — Z79.899: ICD-10-CM

## 2025-06-08 DIAGNOSIS — R42: Primary | ICD-10-CM

## 2025-06-08 LAB
ACANTHOCYTES BLD QL SMEAR: (no result)
AGRAN PLATELETS BLD QL SMEAR: (no result)
ALBUMIN SERPL-MCNC: 3.73 G/DL (ref 3.4–5)
ALP SERPL-CCNC: 81 U/L (ref 46–116)
ALT SERPL-CCNC: 27 U/L (ref 14–63)
ANION GAP SERPL CALC-SCNC: 12.1 MMOL/L (ref 5–15)
ANISOCYTOSIS BLD QL SMEAR: (no result)
AST SERPL-CCNC: 17 U/L (ref 15–37)
AUER BODIES BLD QL SMEAR: (no result)
BASO STIPL BLD QL SMEAR: (no result)
BASOPHILS # BLD AUTO: 0.06 10^3/UL (ref 0–0.1)
BASOPHILS NFR BLD AUTO: 0.5 % (ref 0–1)
BILIRUB SERPL-MCNC: 0.7 MG/DL (ref 0.2–1)
BUN SERPL-MCNC: 33 MG/DL (ref 7–18)
BURR CELLS BLD QL SMEAR: (no result)
CABOT RINGS BLD QL SMEAR: (no result)
CALCIUM SERPL-MCNC: 9.5 MG/DL (ref 8.7–10.3)
CHLORIDE SERPL-SCNC: 98 MMOL/L (ref 98–107)
CO2 SERPL-SCNC: 28.4 MMOL/L (ref 21–32)
CREAT CL 24H UR+SERPL-VRATE: 57.28 ML/MIN
CREAT SERPL-MCNC: 0.77 MG/DL (ref 0.51–1.17)
DACRYOCYTES BLD QL SMEAR: (no result)
DOHLE BOD BLD QL SMEAR: (no result)
EGFRCR SERPLBLD CKD-EPI 2021: 79 ML/MIN (ref 60–?)
ELLIPTOCYTES BLD QL SMEAR: (no result)
EOSINOPHIL # BLD AUTO: 0.12 10^3/UL (ref 0.1–0.3)
EOSINOPHIL NFR BLD AUTO: 1 % (ref 1–3)
ERYTHROCYTE [DISTWIDTH] IN BLOOD BY AUTOMATED COUNT: 14.3 % (ref 11.5–14.5)
GIANT PLATELETS BLD QL SMEAR: (no result)
GLUCOSE SERPL-MCNC: 124 MG/DL (ref 70–140)
HCT VFR BLD AUTO: 34.7 % (ref 37–47)
HEINZ BOD BLD QL SMEAR: (no result)
HELMET CELLS BLD QL SMEAR: (no result)
HGB BLD-MCNC: 11.8 G/DL (ref 12–16)
HOWELL-JOLLY BOD BLD QL SMEAR: (no result)
HYPOCHROMIA BLD QL SMEAR: (no result)
IMM GRANULOCYTES # BLD: 0.04 10^3/UL (ref 0–0.04)
IMM GRANULOCYTES NFR BLD: 0.3 % (ref 0–0.4)
LYMPH ABN # BLD MANUAL: (no result) 10*3/UL
LYMPHOCYTES # BLD AUTO: 2.08 10^3/UL (ref 1–4)
LYMPHOCYTES NFR BLD AUTO: 17.6 % (ref 20–40)
Lab: (no result)
MACROCYTES BLD QL SMEAR: (no result)
MCH RBC QN AUTO: 28.9 PG (ref 27–31)
MCHC RBC AUTO-ENTMCNC: 34 G/DL (ref 32–36)
MCHC RBC AUTO-ENTMCNC: 85 FL (ref 82–92)
MICROCYTES BLD QL SMEAR: (no result)
MONOCYTES # BLD AUTO: 1.25 10^3/UL (ref 0.1–0.8)
MONOCYTES NFR BLD AUTO: 10.6 % (ref 2–8)
NEUTROPHILS # BLD AUTO: 8.24 10^3/UL (ref 2.5–7)
NEUTROPHILS NFR BLD AUTO: 70 % (ref 50–70)
NEUTS HYPERSEG BLD QL SMEAR: (no result)
OVALOCYTES BLD QL SMEAR: (no result)
PAPPENHEIMER BOD BLD QL SMEAR: (no result)
PARASITE IDENTIFIED IN BLOOD BY LIGHT MICROSCOPY: (no result)
PATH REV BLD -IMP: (no result)
PELGER HUET CELLS BLD QL SMEAR: (no result)
PELGER HUET CELLS BLD QL SMEAR: (no result)
PLASMACOID LYMPHS NFR BLD MANUAL: (no result) %
PLATELET # BLD AUTO: 394 10^3/UL (ref 150–400)
PLATELET BLD QL SMEAR: (no result)
PLATELET CLUMP BLD QL SMEAR: (no result)
PLATELET SATEL BLD QL SMEAR: (no result)
PMV BLD AUTO: 10 FL (ref 7.4–10.4)
POIKILOCYTOSIS BLD QL SMEAR: (no result)
POLYCHROMASIA BLD QL SMEAR: (no result)
POTASSIUM SERPL-SCNC: 4.5 MMOL/L (ref 3.5–5.1)
PROT SERPL-MCNC: 7 G/DL (ref 6.4–8.2)
RBC # BLD AUTO: 4.08 10^6/UL (ref 3.8–5.5)
ROULEAUX BLD QL SMEAR: (no result)
SCHISTOCYTES BLD QL SMEAR: (no result)
SICKLE CELLS BLD QL SMEAR: (no result)
SMUDGE CELLS BLD QL SMEAR: (no result)
SODIUM SERPL-SCNC: 134 MMOL/L (ref 136–145)
SPHEROCYTES BLD QL SMEAR: (no result)
STOMATOCYTES BLD QL SMEAR: (no result)
TARGETS BLD QL SMEAR: (no result)
TOXIC GRANULES BLD QL SMEAR: (no result)
VARIANT LYMPHS BLD QL SMEAR: (no result)
VARIANT LYMPHS BLD QL SMEAR: (no result)
VARIANT LYMPHS NFR BLD MANUAL: (no result) %
WBC # BLD AUTO: 11.79 10^3/UL (ref 5–10)
WBC NRBC COR # BLD AUTO: (no result) K/UL (ref 5–10)

## 2025-06-08 RX ADMIN — ONDANSETRON ONE: 2 INJECTION, SOLUTION INTRAMUSCULAR; INTRAVENOUS at 18:01
